# Patient Record
Sex: FEMALE | Race: WHITE | Employment: FULL TIME | URBAN - METROPOLITAN AREA
[De-identification: names, ages, dates, MRNs, and addresses within clinical notes are randomized per-mention and may not be internally consistent; named-entity substitution may affect disease eponyms.]

---

## 2017-03-31 ENCOUNTER — HOSPITAL ENCOUNTER (OUTPATIENT)
Dept: MAMMOGRAPHY | Age: 47
Discharge: HOME | End: 2017-03-31
Attending: OBSTETRICS & GYNECOLOGY
Payer: COMMERCIAL

## 2017-03-31 DIAGNOSIS — Z12.31 VISIT FOR SCREENING MAMMOGRAM: ICD-10-CM

## 2017-03-31 PROCEDURE — 77067 SCR MAMMO BI INCL CAD: CPT

## 2018-07-20 ENCOUNTER — HOSPITAL ENCOUNTER (OUTPATIENT)
Dept: MAMMOGRAPHY | Age: 48
Discharge: HOME | End: 2018-07-20
Attending: OBSTETRICS & GYNECOLOGY
Payer: COMMERCIAL

## 2018-07-20 DIAGNOSIS — Z12.31 VISIT FOR SCREENING MAMMOGRAM: ICD-10-CM

## 2018-07-20 PROCEDURE — 77067 SCR MAMMO BI INCL CAD: CPT

## 2019-08-02 ENCOUNTER — HOSPITAL ENCOUNTER (OUTPATIENT)
Dept: MAMMOGRAPHY | Age: 49
Discharge: HOME | End: 2019-08-02
Attending: OBSTETRICS & GYNECOLOGY
Payer: COMMERCIAL

## 2019-08-02 DIAGNOSIS — Z12.31 SCREENING MAMMOGRAM, ENCOUNTER FOR: ICD-10-CM

## 2019-08-02 PROCEDURE — 77063 BREAST TOMOSYNTHESIS BI: CPT

## 2020-10-09 ENCOUNTER — HOSPITAL ENCOUNTER (OUTPATIENT)
Dept: MAMMOGRAPHY | Age: 50
Discharge: HOME | End: 2020-10-09
Attending: OBSTETRICS & GYNECOLOGY
Payer: COMMERCIAL

## 2020-10-09 DIAGNOSIS — Z12.31 VISIT FOR SCREENING MAMMOGRAM: ICD-10-CM

## 2020-10-09 PROCEDURE — 77063 BREAST TOMOSYNTHESIS BI: CPT

## 2021-11-22 ENCOUNTER — TRANSCRIBE ORDER (OUTPATIENT)
Dept: SCHEDULING | Age: 51
End: 2021-11-22

## 2021-11-22 DIAGNOSIS — Z12.31 SCREENING MAMMOGRAM FOR HIGH-RISK PATIENT: Primary | ICD-10-CM

## 2021-12-15 ENCOUNTER — HOSPITAL ENCOUNTER (OUTPATIENT)
Dept: MAMMOGRAPHY | Age: 51
Discharge: HOME | End: 2021-12-15
Attending: OBSTETRICS & GYNECOLOGY
Payer: COMMERCIAL

## 2021-12-15 DIAGNOSIS — Z12.31 SCREENING MAMMOGRAM FOR HIGH-RISK PATIENT: ICD-10-CM

## 2021-12-15 PROCEDURE — 77063 BREAST TOMOSYNTHESIS BI: CPT

## 2022-12-09 RX ORDER — NORGESTIMATE AND ETHINYL ESTRADIOL 0.25-0.035
1 KIT ORAL DAILY
Qty: 1 PACKET | Refills: 11 | Status: SHIPPED | OUTPATIENT
Start: 2022-12-09

## 2022-12-09 RX ORDER — ESTRADIOL 2 MG/1
TABLET ORAL
Qty: 21 TABLET | Refills: 5 | Status: SHIPPED | OUTPATIENT
Start: 2022-12-09

## 2023-01-11 ENCOUNTER — TELEPHONE (OUTPATIENT)
Dept: OBGYN CLINIC | Age: 53
End: 2023-01-11

## 2023-01-11 RX ORDER — NORGESTIMATE AND ETHINYL ESTRADIOL 0.25-0.035
1 KIT ORAL DAILY
Qty: 1 PACKET | Refills: 1 | Status: SHIPPED | OUTPATIENT
Start: 2023-01-11

## 2023-01-11 NOTE — TELEPHONE ENCOUNTER
Orders Placed This Encounter    norgestimate-ethinyl estradiol (ORTHO-CYCLEN) 0.25-35 MG-MCG per tablet     Sig: Take 1 tablet by mouth daily     Dispense:  1 packet     Refill:  1

## 2023-01-11 NOTE — TELEPHONE ENCOUNTER
MARTHA was scheduled for today at [place] office and she thought it was here in [facility]. Appointment rescheduled to 2-22-23. She will need another refill on BCP until appointment. Uses [facility] on [address].